# Patient Record
Sex: FEMALE | Race: WHITE | HISPANIC OR LATINO | Employment: PART TIME | URBAN - METROPOLITAN AREA
[De-identification: names, ages, dates, MRNs, and addresses within clinical notes are randomized per-mention and may not be internally consistent; named-entity substitution may affect disease eponyms.]

---

## 2017-01-06 ENCOUNTER — GENERIC CONVERSION - ENCOUNTER (OUTPATIENT)
Dept: OTHER | Facility: OTHER | Age: 47
End: 2017-01-06

## 2017-01-11 ENCOUNTER — GENERIC CONVERSION - ENCOUNTER (OUTPATIENT)
Dept: OTHER | Facility: OTHER | Age: 47
End: 2017-01-11

## 2017-02-02 ENCOUNTER — ALLSCRIPTS OFFICE VISIT (OUTPATIENT)
Dept: OTHER | Facility: OTHER | Age: 47
End: 2017-02-02

## 2017-02-02 ENCOUNTER — TRANSCRIBE ORDERS (OUTPATIENT)
Dept: ADMINISTRATIVE | Facility: HOSPITAL | Age: 47
End: 2017-02-02

## 2017-02-02 DIAGNOSIS — M54.50 LOW BACK PAIN: ICD-10-CM

## 2017-02-02 DIAGNOSIS — R29.90 UNSPECIFIED SYMPTOMS AND SIGNS INVOLVING THE NERVOUS SYSTEM: ICD-10-CM

## 2017-02-02 DIAGNOSIS — M54.5 LOW BACK PAIN, UNSPECIFIED BACK PAIN LATERALITY, UNSPECIFIED CHRONICITY, WITH SCIATICA PRESENCE UNSPECIFIED: Primary | ICD-10-CM

## 2017-02-17 ENCOUNTER — GENERIC CONVERSION - ENCOUNTER (OUTPATIENT)
Dept: OTHER | Facility: OTHER | Age: 47
End: 2017-02-17

## 2017-02-17 ENCOUNTER — HOSPITAL ENCOUNTER (OUTPATIENT)
Dept: MRI IMAGING | Facility: HOSPITAL | Age: 47
Discharge: HOME | End: 2017-02-17
Attending: PHYSICAL MEDICINE & REHABILITATION
Payer: COMMERCIAL

## 2017-02-17 DIAGNOSIS — R29.90 UNSPECIFIED SYMPTOMS AND SIGNS INVOLVING THE NERVOUS SYSTEM: ICD-10-CM

## 2017-02-17 DIAGNOSIS — M54.50 LOW BACK PAIN: ICD-10-CM

## 2017-02-17 PROCEDURE — 72148 MRI LUMBAR SPINE W/O DYE: CPT

## 2017-02-18 ENCOUNTER — GENERIC CONVERSION - ENCOUNTER (OUTPATIENT)
Dept: OTHER | Facility: OTHER | Age: 47
End: 2017-02-18

## 2017-02-23 ENCOUNTER — ALLSCRIPTS OFFICE VISIT (OUTPATIENT)
Dept: OTHER | Facility: OTHER | Age: 47
End: 2017-02-23

## 2017-03-03 ENCOUNTER — HOSPITAL ENCOUNTER (OUTPATIENT)
Dept: MAMMOGRAPHY | Facility: HOSPITAL | Age: 47
Discharge: HOME | End: 2017-03-03
Payer: COMMERCIAL

## 2017-03-03 DIAGNOSIS — Z12.31 ENCOUNTER FOR SCREENING MAMMOGRAM FOR MALIGNANT NEOPLASM OF BREAST: ICD-10-CM

## 2017-03-03 PROCEDURE — G0202 SCR MAMMO BI INCL CAD: HCPCS

## 2017-03-15 ENCOUNTER — GENERIC CONVERSION - ENCOUNTER (OUTPATIENT)
Dept: OTHER | Facility: OTHER | Age: 47
End: 2017-03-15

## 2017-03-16 ENCOUNTER — ALLSCRIPTS OFFICE VISIT (OUTPATIENT)
Dept: OTHER | Facility: OTHER | Age: 47
End: 2017-03-16

## 2017-03-16 DIAGNOSIS — M51.26 OTHER INTERVERTEBRAL DISC DISPLACEMENT, LUMBAR REGION: ICD-10-CM

## 2017-03-16 DIAGNOSIS — M54.16 RADICULOPATHY OF LUMBAR REGION: ICD-10-CM

## 2017-03-24 ENCOUNTER — APPOINTMENT (OUTPATIENT)
Dept: PHYSICAL THERAPY | Facility: MEDICAL CENTER | Age: 47
End: 2017-03-24
Payer: COMMERCIAL

## 2017-03-24 DIAGNOSIS — M54.16 RADICULOPATHY OF LUMBAR REGION: ICD-10-CM

## 2017-03-24 DIAGNOSIS — M51.26 OTHER INTERVERTEBRAL DISC DISPLACEMENT, LUMBAR REGION: ICD-10-CM

## 2017-03-24 PROCEDURE — 97161 PT EVAL LOW COMPLEX 20 MIN: CPT

## 2017-03-29 ENCOUNTER — APPOINTMENT (OUTPATIENT)
Dept: PHYSICAL THERAPY | Facility: MEDICAL CENTER | Age: 47
End: 2017-03-29
Payer: COMMERCIAL

## 2017-03-29 PROCEDURE — 97140 MANUAL THERAPY 1/> REGIONS: CPT

## 2017-03-29 PROCEDURE — 97110 THERAPEUTIC EXERCISES: CPT

## 2017-03-29 PROCEDURE — 97010 HOT OR COLD PACKS THERAPY: CPT

## 2017-03-31 ENCOUNTER — APPOINTMENT (OUTPATIENT)
Dept: PHYSICAL THERAPY | Facility: MEDICAL CENTER | Age: 47
End: 2017-03-31
Payer: COMMERCIAL

## 2017-03-31 PROCEDURE — 97140 MANUAL THERAPY 1/> REGIONS: CPT

## 2017-03-31 PROCEDURE — 97110 THERAPEUTIC EXERCISES: CPT

## 2017-04-03 ENCOUNTER — APPOINTMENT (OUTPATIENT)
Dept: PHYSICAL THERAPY | Facility: MEDICAL CENTER | Age: 47
End: 2017-04-03
Payer: COMMERCIAL

## 2017-04-05 ENCOUNTER — APPOINTMENT (OUTPATIENT)
Dept: PHYSICAL THERAPY | Facility: MEDICAL CENTER | Age: 47
End: 2017-04-05
Payer: COMMERCIAL

## 2017-04-05 PROCEDURE — 97110 THERAPEUTIC EXERCISES: CPT

## 2017-04-05 PROCEDURE — 97010 HOT OR COLD PACKS THERAPY: CPT

## 2017-04-13 ENCOUNTER — APPOINTMENT (OUTPATIENT)
Dept: PHYSICAL THERAPY | Facility: MEDICAL CENTER | Age: 47
End: 2017-04-13
Payer: COMMERCIAL

## 2017-04-13 PROCEDURE — 97110 THERAPEUTIC EXERCISES: CPT

## 2017-04-14 ENCOUNTER — APPOINTMENT (OUTPATIENT)
Dept: PHYSICAL THERAPY | Facility: MEDICAL CENTER | Age: 47
End: 2017-04-14
Payer: COMMERCIAL

## 2017-04-17 ENCOUNTER — APPOINTMENT (OUTPATIENT)
Dept: PHYSICAL THERAPY | Facility: MEDICAL CENTER | Age: 47
End: 2017-04-17
Payer: COMMERCIAL

## 2017-04-21 ENCOUNTER — APPOINTMENT (OUTPATIENT)
Dept: PHYSICAL THERAPY | Facility: MEDICAL CENTER | Age: 47
End: 2017-04-21
Payer: COMMERCIAL

## 2017-04-26 ENCOUNTER — APPOINTMENT (OUTPATIENT)
Dept: PHYSICAL THERAPY | Facility: MEDICAL CENTER | Age: 47
End: 2017-04-26
Payer: COMMERCIAL

## 2017-04-28 ENCOUNTER — APPOINTMENT (OUTPATIENT)
Dept: PHYSICAL THERAPY | Facility: MEDICAL CENTER | Age: 47
End: 2017-04-28
Payer: COMMERCIAL

## 2017-05-05 ENCOUNTER — ALLSCRIPTS OFFICE VISIT (OUTPATIENT)
Dept: OTHER | Facility: OTHER | Age: 47
End: 2017-05-05

## 2017-05-05 LAB
BILIRUB UR QL STRIP: NORMAL
CLARITY UR: NORMAL
COLOR UR: YELLOW
GLUCOSE (HISTORICAL): NORMAL
HGB UR QL STRIP.AUTO: NORMAL
KETONES UR STRIP-MCNC: NORMAL MG/DL
LEUKOCYTE ESTERASE UR QL STRIP: NORMAL
NITRITE UR QL STRIP: NORMAL
PH UR STRIP.AUTO: 8.5 [PH]
PROT UR STRIP-MCNC: NORMAL MG/DL
SP GR UR STRIP.AUTO: 1
UROBILINOGEN UR QL STRIP.AUTO: 0.2

## 2017-05-17 ENCOUNTER — GENERIC CONVERSION - ENCOUNTER (OUTPATIENT)
Dept: OTHER | Facility: OTHER | Age: 47
End: 2017-05-17

## 2017-09-14 ENCOUNTER — ALLSCRIPTS OFFICE VISIT (OUTPATIENT)
Dept: OTHER | Facility: OTHER | Age: 47
End: 2017-09-14

## 2017-10-27 ENCOUNTER — ALLSCRIPTS OFFICE VISIT (OUTPATIENT)
Dept: OTHER | Facility: OTHER | Age: 47
End: 2017-10-27

## 2017-10-27 DIAGNOSIS — R10.11 RIGHT UPPER QUADRANT PAIN: ICD-10-CM

## 2017-10-27 LAB
BILIRUB UR QL STRIP: NORMAL
CLARITY UR: NORMAL
COLOR UR: YELLOW
GLUCOSE (HISTORICAL): NORMAL
HGB UR QL STRIP.AUTO: NORMAL
KETONES UR STRIP-MCNC: NORMAL MG/DL
LEUKOCYTE ESTERASE UR QL STRIP: NORMAL
NITRITE UR QL STRIP: NORMAL
PH UR STRIP.AUTO: 5 [PH]
PROT UR STRIP-MCNC: NORMAL MG/DL
SP GR UR STRIP.AUTO: 1.02
UROBILINOGEN UR QL STRIP.AUTO: 0.2

## 2017-10-28 LAB
A/G RATIO (HISTORICAL): 1.2 (CALC) (ref 1–2.5)
ALBUMIN SERPL BCP-MCNC: 4 G/DL (ref 3.6–5.1)
ALP SERPL-CCNC: 66 U/L (ref 33–115)
ALT SERPL W P-5'-P-CCNC: 12 U/L (ref 6–29)
AST SERPL W P-5'-P-CCNC: 14 U/L (ref 10–35)
BASOPHILS # BLD AUTO: 0.5 %
BASOPHILS # BLD AUTO: 31 CELLS/UL (ref 0–200)
BILIRUB SERPL-MCNC: 0.4 MG/DL (ref 0.2–1.2)
BUN SERPL-MCNC: 14 MG/DL (ref 7–25)
BUN/CREA RATIO (HISTORICAL): NORMAL (CALC) (ref 6–22)
CALCIUM SERPL-MCNC: 9.3 MG/DL (ref 8.6–10.2)
CHLORIDE SERPL-SCNC: 104 MMOL/L (ref 98–110)
CO2 SERPL-SCNC: 26 MMOL/L (ref 20–31)
CREAT SERPL-MCNC: 0.69 MG/DL (ref 0.5–1.1)
DEPRECATED RDW RBC AUTO: 13.8 % (ref 11–15)
EGFR AFRICAN AMERICAN (HISTORICAL): 121 ML/MIN/1.73M2
EGFR-AMERICAN CALC (HISTORICAL): 104 ML/MIN/1.73M2
EOSINOPHIL # BLD AUTO: 167 CELLS/UL (ref 15–500)
EOSINOPHIL # BLD AUTO: 2.7 %
GAMMA GLOBULIN (HISTORICAL): 3.4 G/DL (CALC) (ref 1.9–3.7)
GLUCOSE (HISTORICAL): 92 MG/DL (ref 65–99)
HCT VFR BLD AUTO: 35.9 % (ref 35–45)
HGB BLD-MCNC: 12.1 G/DL (ref 11.7–15.5)
LYMPHOCYTES # BLD AUTO: 1885 CELLS/UL (ref 850–3900)
LYMPHOCYTES # BLD AUTO: 30.4 %
MCH RBC QN AUTO: 29.8 PG (ref 27–33)
MCHC RBC AUTO-ENTMCNC: 33.7 G/DL (ref 32–36)
MCV RBC AUTO: 88.4 FL (ref 80–100)
MONOCYTES # BLD AUTO: 546 CELLS/UL (ref 200–950)
MONOCYTES (HISTORICAL): 8.8 %
NEUTROPHILS # BLD AUTO: 3571 CELLS/UL (ref 1500–7800)
NEUTROPHILS # BLD AUTO: 57.6 %
PLATELET # BLD AUTO: 351 THOUSAND/UL (ref 140–400)
PMV BLD AUTO: 10.9 FL (ref 7.5–12.5)
POTASSIUM SERPL-SCNC: 4.2 MMOL/L (ref 3.5–5.3)
RBC # BLD AUTO: 4.06 MILLION/UL (ref 3.8–5.1)
SODIUM SERPL-SCNC: 138 MMOL/L (ref 135–146)
TOTAL PROTEIN (HISTORICAL): 7.4 G/DL (ref 6.1–8.1)
WBC # BLD AUTO: 6.2 THOUSAND/UL (ref 3.8–10.8)

## 2017-10-28 NOTE — PROGRESS NOTES
Assessment  1  Abdominal pain, RUQ (789 01) (R10 11)    Plan   Abdominal pain, RUQ    · (1) CBC/PLT/DIFF; Status:Active; Requested KHP:[id number];    · (1) COMPREHENSIVE METABOLIC PANEL; Status:Active; Requested KIC:[id number];    · * US ABDOMEN COMPLETE; Status:Hold For - Scheduling; Requested UZE:[id number];       A/P  1 RUQ abdominal pain: we will have you get blood work and an ultrasound of the abdomen done  we will call you with the results  please follow a bland diet in the meantime  2/ S/P UTI: we will get the results of the urine culture from the [facility] but you are alos seeing uro today for this as this is a chronic issue for you  rto prn   Urine Dip Non-Automated- POC; Status:Resulted - Requires Verification,Retrospective By Protocol Authorization;   Done: [id number] 12:00AM  Due:27Oct2018; Last Updated By:Pauline De León; 10/27/2017 12:44:16 PM;Ordered; For:Right lower quadrant abdominal pain; Ordered By:German Aldridge;     Discussion/Summary    danay due - 03/03/2018  pap due - 01/2019  flu - def     Chief Complaint  pt  presents in the office today due to right side pain possibly a UTI, she states that she is having back pain  due - 03/03/2018due - 01/2019- def         History of Present Illness  HPI: Here today with complaint of ongoing RUQ pain  been getting pain in the RUQ on and off for about 3 weeks   was   dull pain that radiates to her back  there in the am when she gets up in the am  is not sure if it is worse with eating   Bartholome Pinks  just gets this on and off  af but not immediately after eating rated up to a 7 on a 1-10 scale  on the 15th of October started with with burning and urgency with urination  on the 17th [facility]   they did a urine and was told that she had a uti and was started on Bactrim  pain in the RUQ went way while on the antibiotic  the last Bactrim 3 days ago and since then the pain in the RUQ started back again  Bartholome Pinks gets nauseous with the pain issues resolved        Review of Systems    Constitutional: No fever, no chills, feels well, no tiredness, no recent weight gain or loss  Cardiovascular: no complaints of slow or fast heart rate, no chest pain, no palpitations, no leg claudication or lower extremity edema  Respiratory: no complaints of shortness of breath, no wheezing, no dyspnea on exertion, no orthopnea or PND  Gastrointestinal: as noted in HPI  Genitourinary: no complaints of dysuria, no incontinence, no pelvic pain, no dysmenorrhea, no vaginal discharge or abnormal vaginal bleeding-- and-- as noted in HPI  Musculoskeletal: no complaints of arthralgia, no myalgia, no joint swelling or stiffness, no limb pain or swelling  Integumentary: no complaints of skin rash or lesion, no itching or dry skin, no skin wounds  Active Problems  1  Abnormal neurological exam (781 99) (R29 90)   2  Allergic rhinitis (477 9) (J30 9)   3  Colonoscopy (Fiberoptic) Screening   4  Constipation (564 00) (K59 00)   5  Esophageal reflux (530 81) (K21 9)   6  Herniated lumbar intervertebral disc (722 10) (M51 26)   7  Hyperlipidemia (272 4) (E78 5)   8  Internal hemorrhoids (455 0) (K64 8)   9  Never a smoker   10  Vitamin D deficiency (268 9) (E55 9)    Past Medical History  1  History of Abdominal pain, RUQ (789 01) (R10 11)   2  History of Encounter for routine gynecological examination (V72 31) (Z01 419)   3  History of Encounter for routine gynecological examination (V72 31) (Z01 419)   4  History of abdominal pain (V13 89) (Z87 898)   5  History of asthma (V12 69) (Z87 09)   6  History of constipation (V12 79) (Z87 19)   7  History of esophageal reflux (V12 79) (Z87 19)   8  History of neoplasm of uncertain behavior of skin (V13 3) (Z86 03)   9  History of screening mammography (V15 89) (Z92 89)   10  History of Symptoms involving urinary system (788 99) (R39 9)   11  History of Well adult on routine health check (V70 0) (Z00 00)    Family History  Mother    1   Denied: Family history of drug abuse   2  Family history of Heart Disease (V17 49)   3  Denied: Family history of Mental health problem   4  Family history of Mother  At Age 62  Father    11  Denied: Family history of drug abuse   6  Family history of Heart Disease (V17 49)   7  Denied: Family history of Mental health problem  Maternal Grandfather    8  Family history of Prostate Cancer (Z78 63)  Paternal Grandfather    5  Family history of Gastric Cancer (V16 0)  Maternal Uncle    10  Family history of colon cancer (V16 0) (Z80 0)   11  Family history of malignant neoplasm of stomach (V16 0) (Z80 0)  Family History Reviewed: The family history was reviewed and updated today  Social History   · Always uses seat belt   · Caffeine Use   · 2 cups coffee/day   · Employed   · Lives with family   ·    · Never a smoker   · No alcohol use   · No illicit drug use   · Student  The social history was reviewed and updated today  The social history was reviewed and is unchanged  Surgical History  1  History of Complete Colonoscopy   2  History of Dental Surgery   3  History of Dilation And Curettage   4  History of Tonsillectomy   5  History of Tubal Ligation    Current Meds   1  Ibuprofen 200 MG Oral Tablet; Therapy: (Recorded:2017) to Recorded   2  Metamucil POWD;   Therapy: (Recorded:2017) to Recorded   3  Milk of Magnesia 400 MG/5ML Oral Suspension; 15 ml by mouth x 1 dose, may repeat in   4 hours if no BM; Therapy: 13GGK4620 to (Last Rx:2016) Ordered   4  Tylenol 500 MG CAPS; Therapy: (Recorded:2017) to Recorded   5  Ventolin  (90 Base) MCG/ACT Inhalation Aerosol Solution; INHALE 2 PUFFS   EVERY 4-6 HOURS AS NEEDED  Requested for: 14UEY1615; Last YW:[id number]   Ordered   6  Zantac 150 MG Oral Tablet; TAKE 1 TABLET DAILY AS NEEDED; Therapy: (Recorded:2016) to Recorded    Allergies  1  No Known Drug Allergies  2   Other    Vitals   Recorded: 97APP0294 11:33AM   Temperature 98 1 F   Heart Rate 78   Respiration 14   Systolic 453   Diastolic 78   Height 5 ft    Weight 143 lb    BMI Calculated 27 93   BSA Calculated 1 62     Physical Exam    Constitutional   General appearance: No acute distress, well appearing and well nourished  Pulmonary   Auscultation of lungs: Clear to auscultation  Cardiovascular   Auscultation of heart: Normal rate and rhythm, normal S1 and S2, without murmurs  Carotid pulses: Normal     Abdomen   Abdomen: Abnormal  -- R UQ pain to palpation with positive Quenemo sign  Liver and spleen: No hepatomegaly or splenomegaly  Lymphatic   Palpation of lymph nodes in neck: No lymphadenopathy  Skin   Skin and subcutaneous tissue: Normal without rashes or lesions      Psychiatric   Orientation to person, place, and time: Normal     Mood and affect: Normal          Signatures   Electronically signed by : Mali Rucker; Oct 27 2017  1:25PM EST                       (Author)    Electronically signed by : James Kuhn DO; Oct 27 2017  5:25PM EST

## 2017-10-29 LAB — CULTURE RESULT (HISTORICAL): NORMAL

## 2017-11-01 ENCOUNTER — HOSPITAL ENCOUNTER (OUTPATIENT)
Dept: ULTRASOUND IMAGING | Facility: HOSPITAL | Age: 47
Discharge: HOME | End: 2017-11-01
Payer: COMMERCIAL

## 2017-11-01 DIAGNOSIS — R10.11 RIGHT UPPER QUADRANT PAIN: ICD-10-CM

## 2017-11-01 PROCEDURE — 76700 US EXAM ABDOM COMPLETE: CPT

## 2018-01-12 VITALS
WEIGHT: 145 LBS | DIASTOLIC BLOOD PRESSURE: 60 MMHG | HEIGHT: 60 IN | BODY MASS INDEX: 28.47 KG/M2 | SYSTOLIC BLOOD PRESSURE: 114 MMHG | RESPIRATION RATE: 12 BRPM | HEART RATE: 80 BPM

## 2018-01-12 VITALS
RESPIRATION RATE: 14 BRPM | TEMPERATURE: 98.1 F | SYSTOLIC BLOOD PRESSURE: 118 MMHG | HEART RATE: 78 BPM | DIASTOLIC BLOOD PRESSURE: 78 MMHG | HEIGHT: 60 IN | WEIGHT: 143 LBS | BODY MASS INDEX: 28.07 KG/M2

## 2018-01-13 VITALS
HEART RATE: 68 BPM | HEIGHT: 60 IN | DIASTOLIC BLOOD PRESSURE: 78 MMHG | BODY MASS INDEX: 28.66 KG/M2 | WEIGHT: 146 LBS | SYSTOLIC BLOOD PRESSURE: 126 MMHG

## 2018-01-13 NOTE — RESULT NOTES
Verified Results  * MRI LUMBAR SPINE WO CONTRAST [id number] 07:15AM Halima Segura Order Number: [id number]   Performing Comments: Pt failed PT a with last Tx August 2016, today has assymmetric refklex exam that correlates to possible right S-1 root compression syndrome    - Patient Instructions: Being done at [facility] on 2/17, be there at 7:15AM    Bring photo ID, list of medications & insurance cards   Bring this form, NO jewelry     Test Name Result Flag Reference   MRI [address] SUBRAMANIAN (Report)     This is a summary report  The complete report is available in the patient's medical record  If you cannot access the medical record, please contact the sending organization for a detailed fax or copy  MRI LUMBAR SPINE WITHOUT CONTRAST     INDICATION: Low back pain with right-sided sciatica  Possible S1 nerve root compression on the right  COMPARISON: None  TECHNIQUE: Sagittal T1, sagittal T2, sagittal inversion recovery, axial T1 and axial T2, coronal T2       IMAGE QUALITY: Diagnostic     FINDINGS:     ALIGNMENT: Normal alignment of the lumbar spine  No compression fracture  No spondylolysis or spondylolisthesis  No scoliosis  MARROW SIGNAL: Slightly heterogeneous marrow signal on T1-weighted imaging with scattered hemangiomas  No focal marrow edema  DISTAL CORD AND CONUS: Normal size and signal within the distal cord and conus  The conus ends at the L1 level  PARASPINAL SOFT TISSUES: Paraspinal soft tissues are unremarkable  SACRUM: Normal signal within the sacrum  No evidence of insufficiency or stress fracture  LOWER THORACIC DISC SPACES: Normal disc height and signal  No disc herniation, canal stenosis or foraminal narrowing  LUMBAR DISC SPACES:        L1-L2: Normal      L2-L3: Normal      L3-L4: Slight annular bulging with a small broad-based left foraminal and lateral disc protrusion  No canal stenosis   Moderate left foraminal narrowing with slight displacement of the nerve  Correlate for left L3 radiculopathy  L4-L5: Mild annular bulging with a moderate broad-based central and right paracentral disc herniation which is slightly inferiorly extruded to the right of midline  This distorts the right anterolateral aspect of the thecal sac with mass effect upon    the right L5 nerve  Mild canal stenosis  No foraminal nerve impingement  L5-S1: Loss of disc height  Mild annular bulging  Partial sacralization of the L5 vertebral body bilaterally  No disc herniation, canal stenosis or foraminal narrowing  IMPRESSION:     Please note the L5 vertebral body is partially sacralized bilaterally  At L4-5 there is a broad-based central and right paracentral disc herniation which is slightly inferiorly extruded on the right and distorts the right anterolateral aspect of the thecal sac with mild mass effect upon the right L5 nerve  Correlate for    right L5 radiculopathy  Small broad-based left foraminal disc protrusion at L3-4 with moderate left foraminal narrowing  Correlate for left L3 radiculopathy         Workstation performed: [id number]     Signed by:   Pavan Christianson DO   2/17/17

## 2018-01-14 VITALS
RESPIRATION RATE: 14 BRPM | TEMPERATURE: 98 F | BODY MASS INDEX: 28.11 KG/M2 | SYSTOLIC BLOOD PRESSURE: 100 MMHG | HEART RATE: 76 BPM | DIASTOLIC BLOOD PRESSURE: 62 MMHG | WEIGHT: 143.19 LBS | HEIGHT: 60 IN

## 2018-01-14 VITALS
HEART RATE: 80 BPM | TEMPERATURE: 97.1 F | WEIGHT: 138.5 LBS | HEIGHT: 60 IN | RESPIRATION RATE: 14 BRPM | BODY MASS INDEX: 27.19 KG/M2 | DIASTOLIC BLOOD PRESSURE: 76 MMHG | SYSTOLIC BLOOD PRESSURE: 117 MMHG

## 2018-01-14 NOTE — PROGRESS NOTES
Assessment    1  Encounter for preventive health examination (V70 0) (Z00 00)    Plan  Encounter for screening mammogram for malignant neoplasm of breast    · DIGTL SCRN MAMMO W/CAD ; every 1 year; Last 48Kya3367; Next 47JRW0075;  [address] Maintenance    · Follow-up visit in 1 year Evaluation and Treatment  Follow-up  Status: Complete  Done:  42QPH4544    Discussion/Summary  health maintenance visit Currently, she eats an adequate diet and has an adequate exercise regimen  cervical cancer screening is current cervical cancer screening is managed by Gyn Dr Palma Mortensen Breast cancer screening: monthly self breast exam was advised and mammogram is current  Colorectal cancer screening: colorectal cancer screening is current and the next colonoscopy is due 02/2017  The immunizations are up to date  Advice and education were given regarding nutrition, weight bearing exercise, calcium supplements, vitamin D supplements, sunscreen use and self skin examination  Patient discussion: discussed with the patient  Chief Complaint  pt here for yearly pe    colonoscopy due 02/20/2017  mammo due 12/28/2016  pap due 10/30/2016      History of Present Illness  HM, Adult Female: The patient is being seen for a health maintenance evaluation  General Health: The patient's health since the last visit is described as good  She has regular dental visits  She denies vision problems  She denies hearing loss  Immunizations status: up to date  Lifestyle:  She consumes a diverse and healthy diet  She exercises regularly  She does not use tobacco  She denies alcohol use  She denies drug use  Reproductive health: the patient is premenopausal   she reports normal menses  she uses contraception  For contraception, she has had a tubal occlusion  Screening:      Review of Systems    Constitutional: No fever, no chills, feels well, no tiredness, no recent weight gain or weight loss     Eyes: No complaints of eye pain, no red eyes, no eyesight problems, no discharge, no dry eyes, no itching of eyes  ENT: no complaints of earache, no loss of hearing, no nose bleeds, no nasal discharge, no sore throat, no hoarseness  Cardiovascular: No complaints of slow heart rate, no fast heart rate, no chest pain, no palpitations, no leg claudication, no lower extremity edema  Respiratory: No complaints of shortness of breath, no wheezing, no cough, no SOB on exertion, no orthopnea, no PND  Gastrointestinal: No complaints of abdominal pain, no constipation, no nausea or vomiting, no diarrhea, no bloody stools  Genitourinary: no dysuria, no pelvic pain, no vaginal discharge, no dysmenorrhea and no unexplained vaginal bleeding  Musculoskeletal: no arthralgias, no joint swelling, no limb pain, no joint stiffness and no limb swelling  Integumentary: no rashes and no skin lesions  Neurological: no headache, no numbness, no tingling, no dizziness, no limb weakness, no fainting and no difficulty walking  Psychiatric: no anxiety, no sleep disturbances and no depression  Hematologic/Lymphatic: no swollen glands  Surgical History    · History of Complete Colonoscopy   · History of Dental Surgery   · History of Dilation And Curettage   · History of Tonsillectomy   · History of Tubal Ligation    Family History  Mother    · Family history of Heart Disease (V17 49)   · Family history of Mother  At Age 62  Father    · Family history of Heart Disease (V17 49)  Maternal Grandfather    · Family history of Prostate Cancer (V16 42)  Paternal Grandfather    · Family history of Gastric Cancer (V16 0)  Maternal Uncle    · Family history of colon cancer (V16 0) (Z80 0)   · Family history of malignant neoplasm of stomach (V16 0) (Z80 0)    Social History    · Denied: History of Alcohol Use (History)   · Always uses seat belt   · Caffeine Use   · 2 cups coffee/day   · Denied: History of Drug Use   · Never a smoker    Current Meds   1   Fiber CAPS; USE AS DIRECTED; Therapy: (Nunu Simpson) to Recorded   2  Ventolin  (90 Base) MCG/ACT Inhalation Aerosol Solution; INHALE 2 PUFFS   EVERY 4-6 HOURS AS NEEDED  Requested for: 84KVT5804; Last IJ:76TXM0936   Ordered   3  Zantac 150 MG Oral Tablet; TAKE 1 TABLET DAILY AS NEEDED; Therapy: (Recorded:20Jun2016) to Recorded    Allergies    1  No Known Drug Allergies    2  Other    Vitals   Recorded: 20Jun2016 08:08AM   Heart Rate 72   Respiration 16   Systolic 195, LUE, Sitting   Diastolic 64, LUE, Sitting   Height 4 ft 11 7 in   Weight 141 lb    BMI Calculated 27 81   BSA Calculated 1 6     Physical Exam    Constitutional   General appearance: No acute distress, well appearing and well nourished  Eyes   Conjunctiva and lids: No swelling, erythema or discharge  PERRL, EOMI  Ears, Nose, Mouth, and Throat   External inspection of ears and nose: Normal     Otoscopic examination: Tympanic membranes translucent with normal light reflex  Canals patent without erythema  Lips, teeth, and gums: Normal, good dentition  Oropharynx: Normal with no erythema, edema, exudate or lesions  Neck   Neck: Supple, symmetric, trachea midline, no masses  Thyroid: Normal, no thyromegaly  Pulmonary   Respiratory effort: No increased work of breathing or signs of respiratory distress  Auscultation of lungs: Clear to auscultation  Cardiovascular   Auscultation of heart: Normal rate and rhythm, normal S1 and S2, no murmurs  Examination of extremities for edema and/or varicosities: Normal     Abdomen   Abdomen: Non-tender, no masses  Liver and spleen: No hepatomegaly or splenomegaly  Lymphatic   Palpation of lymph nodes in neck: No lymphadenopathy  Musculoskeletal   Muscle strength/tone: Normal     Neurologic   Cranial nerves: Cranial nerves II-XII intact  Reflexes: 2+ and symmetric  Psychiatric   Orientation to person, place, and time: Normal     Recent and remote memory: Intact      Mood and affect: Normal        Results/Data  (1) LIPID PANEL, FASTING [id number] 10:02AM Aries Jose     Test Name Result Flag Reference   CHOLESTEROL, TOTAL 203 mg/dL H 125-200   HDL CHOLESTEROL 45 mg/dL L > OR = 46   TRIGLICERIDES 371 mg/dL  <150   LDL-CHOLESTEROL 132 mg/dL (calc) H <130   Desirable range <100 mg/dL for patients with CHD or  diabetes and <70 mg/dL for diabetic patients with  known heart disease  CHOL/HDLC RATIO 4 5 (calc)  < OR = 5 0   NON HDL CHOLESTEROL 158 mg/dL (calc)     Target for non-HDL cholesterol is 30 mg/dL higher than   LDL cholesterol target  (1) CBC/PLT/DIFF [id number] 10:02AM Aries Jsoe     Test Name Result Flag Reference   WHITE BLOOD CELL COUNT 6 4 Thousand/uL  3 8-10 8   RED BLOOD CELL COUNT 4 15 Million/uL  3 80-5 10   HEMOGLOBIN 12 0 g/dL  11 7-15 5   HEMATOCRIT 37 2 %  35 0-45 0   MCV 89 6 fL  80 0-100 0   MCH 28 9 pg  27 0-33 0   MCHC 32 2 g/dL  32 0-36 0   RDW 15 1 % H 11 0-15 0   PLATELET COUNT 839 Thousand/uL  140-400   MPV 9 3 fL  7 5-11 5   ABSOLUTE NEUTROPHILS 3379 cells/uL  6635-9248   ABSOLUTE LYMPHOCYTES 2394 cells/uL  850-3900   ABSOLUTE MONOCYTES 435 cells/uL  200-950   ABSOLUTE EOSINOPHILS 147 cells/uL     ABSOLUTE BASOPHILS 45 cells/uL  0-200   NEUTROPHILS 52 8 %     LYMPHOCYTES 37 4 %     MONOCYTES 6 8 %     EOSINOPHILS 2 3 %     BASOPHILS 0 7 %       (1) COMPREHENSIVE METABOLIC PANEL [id number] 96:76IO Aries Garcia     Test Name Result Flag Reference   GLUCOSE 92 mg/dL  65-99   Fasting reference interval   UREA NITROGEN (BUN) 15 mg/dL  7-25   CREATININE 0 75 mg/dL  0 50-1 10   eGFR NON-AFR   AMERICAN 96 mL/min/1 73m2  > OR = 60   eGFR AFRICAN AMERICAN 112 mL/min/1 73m2  > OR = 60   BUN/CREATININE RATIO   6-15   NOT APPLICABLE (calc)   SODIUM 137 mmol/L  135-146   POTASSIUM 4 2 mmol/L  3 5-5 3   CHLORIDE 105 mmol/L     CARBON DIOXIDE 24 mmol/L  19-30   CALCIUM 9 4 mg/dL  8 6-10 2   PROTEIN, TOTAL 7 1 g/dL  6 1-8 1   ALBUMIN 3 9 g/dL 3  6-5 1   GLOBULIN 3 2 g/dL (calc)  1 9-3 7   ALBUMIN/GLOBULIN RATIO 1 2 (calc)  1 0-2 5   BILIRUBIN, TOTAL 0 4 mg/dL  0 2-1 2   ALKALINE PHOSPHATASE 59 U/L     AST 13 U/L  10-35   ALT 12 U/L  6-29     (Q) TSH, 3RD GENERATION W/REFLEX TO FT4 [id number] 10:02AM Nelli Vásquez   REPORT COMMENT:  FASTING:YES     Test Name Result Flag Reference   TSH W/REFLEX TO FT4 1 69 mIU/L     Reference Range                         > or = 20 Years  0 40-4 50                              Pregnancy Ranges            First trimester    0 26-2 66            Second trimester   0 55-2 73            Third trimester    0 43-2 91       Health Management  Colonoscopy (Fiberoptic) Screening   COLONOSCOPY; every 3 years; Last 78Gox7900; Next Due: 86Mnk6297; Active  Encounter for routine gynecological examination   (every) THINPREP PAP; every 2 years; Last 65CLV5750; Next Due: 63GAC5077; Active  Encounter for screening mammogram for malignant neoplasm of breast   DIGTL SCRN MAMMO W/CAD; every 1 year; Last 48Odm1272; Next Due: 13FCK0919; Active    Future Appointments    Date/Time Provider Specialty Site   06/23/2017 10:00 AM VISHAL Harp   Family [address]     Signatures   Electronically signed by : VISHAL Alvarado ; Jun 20 2016  9:10AM EST                       (Author)

## 2018-01-17 NOTE — PROGRESS NOTES
Assessment    1  Herniated lumbar intervertebral disc (722 10) (M51 26)   2  Lumbar radiculitis (724 4) (M54 16)    Plan  Herniated lumbar intervertebral disc    · 1 - Carlitosond Angel Mendoza DO (Anesthesia) Physician Referral  Evaluation and Treatment: the  expectation is that the referred to provider will communicate back to the patient on  treatment options  Status: Active  Requested for: 49Sqi6956  Care Summary provided  : Yes   · Follow-up visit in 6 weeks Evaluation and Treatment  Follow-up  Status: Hold For -  Scheduling  Requested for: 19Oez5246  Herniated lumbar intervertebral disc, Lumbar radiculitis, Sciatica of right side    · MethylPREDNISolone 4 MG Oral Tablet Therapy Pack; Take according to directions    Discussion/Summary  Impression: disc herniation and right L-5 radiculopathy  Currently, the condition is not responding to treatment  Medication changes are as documented in orders  Treatment plan includes pain medicine consultation and evaluation for epidural steroid injection  Chief Complaint  Sub acute LBP consult from PCP   2/23 follow up after MRI      History of Present Illness  54 yo female with non-traumatic onset of LBP about mid December afetr riding trip to [place]  Noticed next day   pattern is diffuse LBP, right buttock, and lateral leg to ankle not foot  Usually better with siting, worse with standing  Had tried muscle relaxant  Throbbing  No bowel or bladder incontinence, but increased pain with BM  Had prior episode in July 2016 and resolved with PTx  This is worse episode   missed some work  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)  Had PT at [facility] INTO August 2016  True Greene  (last sih=gn off by Dr Mary Lopes 7/29)   2/23 f/u after MRI some benefit from NSAIDS N/T into right lateral calf and ankle  Review of Systems    Musculoskeletal: as noted in HPI  Neurological: numbness and tingling  Active Problems    1   Abnormal neurological exam (781 99) (R29 90)   2  Colonoscopy (Fiberoptic) Screening   3  Constipation (564 00) (K59 00)   4  Encounter for routine gynecological examination (V72 31) (Z01 419)   5  Encounter for screening colonoscopy (V76 51) (Z12 11)   6  Encounter for screening mammogram for malignant neoplasm of breast (V76 12)   (Z12 31)   7  Esophageal reflux (530 81) (K21 9)   8  Hyperlipidemia (272 4) (E78 5)   9  Internal hemorrhoids (455 0) (K64 8)   10  Low back pain (724 2) (M54 5)   11  Need for prophylactic vaccination and inoculation against influenza (V04 81) (Z23)   12  Never a smoker   13  Sciatica of right side (724 3) (M54 31)   14  Screening examination for pulmonary tuberculosis (V74 1) (Z11 1)   15  Strain of thoracic region (847 1) (S29 019A)   16  Vitamin D deficiency (268 9) (E55 9)    Past Medical History    1  History of Abdominal pain, RUQ (789 01) (R10 11)   2  History of Encounter for routine gynecological examination (V72 31) (Z01 419)   3  History of abdominal pain (V13 89) (Z87 898)   4  History of asthma (V12 69) (Z87 09)   5  History of constipation (V12 79) (Z87 19)   6  History of esophageal reflux (V12 79) (Z87 19)   7  History of neoplasm of uncertain behavior of skin (V13 3) (Z86 03)   8  Need for prophylactic vaccination and inoculation against influenza (V04 81) (Z23)   9  History of Well adult on routine health check (V70 0) (Z00 00)    The active problems and past medical history were reviewed and updated today  Surgical History    1  History of Complete Colonoscopy   2  History of Dental Surgery   3  History of Dilation And Curettage   4  History of Tonsillectomy   5  History of Tubal Ligation    The surgical history was reviewed and updated today  Family History  Mother    1  Family history of Heart Disease (V17 49)   2  Family history of Mother  At Age 62  Father    3  Family history of Heart Disease (V17 49)  Maternal Grandfather    4   Family history of Prostate Cancer (V16 42)  Paternal Grandfather    5  Family history of Gastric Cancer (V16 0)  Maternal Uncle    6  Family history of colon cancer (V16 0) (Z80 0)   7  Family history of malignant neoplasm of stomach (V16 0) (Z80 0)    The family history was reviewed and updated today  Social History    · Denied: History of Alcohol Use (History)   · Always uses seat belt   · Caffeine Use   · Denied: History of Drug Use   · Never a smoker  The social history was reviewed and is unchanged  Current Meds   1  Fiber CAPS; USE AS DIRECTED; Therapy: (Priscilla Rides) to Recorded   2  Ibuprofen 200 MG Oral Tablet; Therapy: (Recorded:02Feb2017) to Recorded   3  Meloxicam 7 5 MG Oral Tablet; Take one after morning meal daily, may take one after   evening meal as needed; Therapy: 25LQP1008 to (Last Rx:02Feb2017)  Requested for: 02Feb2017 Ordered   4  Milk of Magnesia 400 MG/5ML Oral Suspension; 15 ml by mouth x 1 dose, may repeat in   4 hours if no BM; Therapy: 21Oct2016 to (Last Rx:21Oct2016) Ordered   5  Omeprazole 20 MG Oral Capsule Delayed Release; Take one capsule once daily before   eating  Requested for: 45SFG1675; Last Rx:69Ija9279 Ordered   6  Orphenadrine Citrate  MG Oral Tablet Extended Release 12 Hour; TAKE 1   TABLET TWICE DAILY AS NEEDED; Therapy: 26Oci4568 to (Evaluate:18Jan2017)  Requested for: 36OXY1824; Last   Rx:21Otu4078 Ordered   7  Polyethylene Glycol 3350 Oral Powder (MiraLax); MIX 17 GRAMS IN 8 OUNCES OF   WATER AND DRINK ONCE DAILY as neded for constipation; Therapy: 07IQT7665 to (Last [facility])  Requested for: 21Oct2016 Ordered   8  Tylenol 500 MG CAPS; Therapy: (Recorded:02Feb2017) to Recorded   9  Ventolin  (90 Base) MCG/ACT Inhalation Aerosol Solution; INHALE 2 PUFFS   EVERY 4-6 HOURS AS NEEDED  Requested for: 75QXZ1989; Last PX:29BBL1306   Ordered   10  Zantac 150 MG Oral Tablet (RaNITidine HCl); TAKE 1 TABLET DAILY AS NEEDED;     Therapy: (Recorded:20Jun2016) to Recorded    The medication list was reviewed and updated today  Allergies    1  No Known Drug Allergies    2  Other    Vitals  Signs   Recorded: 85Hqo8563 01:07PM   Heart Rate: 88  Systolic: 972  Diastolic: 80  Height: 5 ft   Weight: 145 lb   BMI Calculated: 28 32  BSA Calculated: 1 62    Physical Exam    Constitutional   General appearance: Abnormal   uncomfortable  Lumbar/Sacral Spine examination demonstrates Lumbosacral Spine:   Evaluation of Muscle Stretch Reflexes on the right side demonstrates 1/4 Hamstring Reflex, 1/4 Knee Jerk Reflex and 1/4 Ankle Jerk Reflex  Evaluation of Muscle Stretch Reflexes on the left side demonstrates 1/4 Hamstring Reflex, 1/4 Knee Jerk Reflex and 1/4 Ankle Jerk Reflex  Special Tests: positive Straight Leg Raise on right  Results/Data  I personally reviewed the films/images/results in the office today  My interpretation follows  MRI Review L-4/5 disc extrusion to right L-5  Future Appointments    Date/Time Provider Specialty Site   06/23/2017 10:00 AM VISHAL Grande [facility] FP     Signatures   Electronically signed by : Whitney Torres DO; Feb 23 2017  1:28PM EST                       (Author)

## 2018-01-17 NOTE — PROGRESS NOTES
Assessment    1  Herniated lumbar intervertebral disc (722 10) (M51 26)    Plan  Herniated lumbar intervertebral disc    · Follow-up PRN Evaluation and Treatment  Follow-up  Status: Complete  Done:  26BPC3296 04:17PM    Discussion/Summary  Treatment plan includes exercise regimen  Patient discussion: discussed with the patient, call as neede  Chief Complaint  Sub acute LBP consult from PCP   2/23 follow up after MRI  5/5 follow up for same  History of Present Illness  54 yo female with non-traumatic onset of LBP about mid December after riding trip to [place]  Noticed next day   pattern is diffuse LBP, right buttock, and lateral leg to ankle not foot  Usually better with siting, worse with standing  Had tried muscle relaxant  Throbbing  No bowel or bladder incontinence, but increased pain with BM  Had prior episode in July 2016 and resolved with PTx  This is worse episode   missed some work  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)  Had PT at [facility] AT THE [facility] INTO August 2016  Heike Chan  (last sign off by Dr Amy Guajardo 7/29)   2/23 f/u after MRI some benefit from NSAIDS N/T into right lateral calf and ankle  5/5 follow up while in PTx  seen by Pain Medicine was doing better so interventions held   sent ot Ptx  markdly improved no radicular Sx  Review of Systems    Musculoskeletal: as noted in HPI  Active Problems    1  Abnormal neurological exam (781 99) (R29 90)   2  Allergic rhinitis (477 9) (J30 9)   3  Colonoscopy (Fiberoptic) Screening   4  Constipation (564 00) (K59 00)   5  Encounter for routine gynecological examination (V72 31) (Z01 419)   6  Encounter for screening colonoscopy (V76 51) (Z12 11)   7  Encounter for screening mammogram for malignant neoplasm of breast (V76 12)   (Z12 31)   8  Esophageal reflux (530 81) (K21 9)   9  Herniated lumbar intervertebral disc (722 10) (M51 26)   10  Hyperlipidemia (272 4) (E78 5)   11   Internal hemorrhoids (455 0) (K64 8)   12  Low back pain (724 2) (M54 5)   13  Lumbar radiculitis (724 4) (M54 16)   14  Need for prophylactic vaccination and inoculation against influenza (V04 81) (Z23)   15  Never a smoker   16  Sciatica of right side (724 3) (M54 31)   17  Screening examination for pulmonary tuberculosis (V74 1) (Z11 1)   18  Strain of thoracic region (847 1) (S29 019A)   19  Symptoms involving urinary system (788 99) (R39 9)   20  Vitamin D deficiency (268 9) (E55 9)    Past Medical History    1  History of Abdominal pain, RUQ (789 01) (R10 11)   2  History of Encounter for routine gynecological examination (V72 31) (Z01 419)   3  History of abdominal pain (V13 89) (Z87 898)   4  History of asthma (V12 69) (Z87 09)   5  History of constipation (V12 79) (Z87 19)   6  History of esophageal reflux (V12 79) (Z87 19)   7  History of neoplasm of uncertain behavior of skin (V13 3) (Z86 03)   8  Need for prophylactic vaccination and inoculation against influenza (V04 81) (Z23)   9  History of Symptoms involving urinary system (788 99) (R39 9)   10  History of Well adult on routine health check (V70 0) (Z00 00)    The active problems and past medical history were reviewed and updated today  Surgical History    1  History of Complete Colonoscopy   2  History of Dental Surgery   3  History of Dilation And Curettage   4  History of Tonsillectomy   5  History of Tubal Ligation    The surgical history was reviewed and updated today  Family History  Mother    1  Family history of Heart Disease (V17 49)   2  Family history of Mother  At Age 62  Father    3  Family history of Heart Disease (V17 49)  Maternal Grandfather    4  Family history of Prostate Cancer (V16 42)  Paternal Grandfather    5  Family history of Gastric Cancer (V16 0)  Maternal Uncle    6  Family history of colon cancer (V16 0) (Z80 0)   7   Family history of malignant neoplasm of stomach (V16 0) (Z80 0)    Social History    · Always uses seat belt   · Caffeine Use   · Employed   · Lives with family   ·    · Never a smoker   · No alcohol use   · No illicit drug use   · Student    Current Meds   1  Ibuprofen 200 MG Oral Tablet; Therapy: (Recorded:05May2017) to Recorded   2  Metamucil POWD;   Therapy: (Recorded:05May2017) to Recorded   3  Milk of Magnesia 400 MG/5ML Oral Suspension; 15 ml by mouth x 1 dose, may repeat in   4 hours if no BM; Therapy: 27DOD8149 to (Last Rx:21Oct2016) Ordered   4  Tylenol 500 MG CAPS; Therapy: (Recorded:05May2017) to Recorded   5  Ventolin  (90 Base) MCG/ACT Inhalation Aerosol Solution; INHALE 2 PUFFS   EVERY 4-6 HOURS AS NEEDED  Requested for: 22FGQ1851; Last YT:26VWY1116   Ordered   6  Zantac 150 MG Oral Tablet (RaNITidine HCl); TAKE 1 TABLET DAILY AS NEEDED; Therapy: (Christina Reining) to Recorded   7  ZyrTEC Allergy 10 MG Oral Capsule; Therapy: (Recorded:05May2017) to Recorded    The medication list was reviewed and updated today  Allergies    1  No Known Drug Allergies    2  Other    Vitals  Signs   Recorded: 89NIB9720 04:00PM   Heart Rate: 92  Systolic: 322  Diastolic: 80  Height: 5 ft   Weight: 144 lb 3 04 oz  BMI Calculated: 28 16  BSA Calculated: 1 62    Physical Exam    Constitutional   General appearance: Well developed, well nourished, alert, in no distress, non-toxic and no overt pain behavior  Lumbar/Sacral Spine examination demonstrates Lumbosacral Spine:   Evaluation of Muscle Stretch Reflexes on the right side demonstrates 1/4 Hamstring Reflex, 1/4 Knee Jerk Reflex and 1/4 Ankle Jerk Reflex  Evaluation of Muscle Stretch Reflexes on the left side demonstrates 1/4 Hamstring Reflex, 1/4 Knee Jerk Reflex and 1/4 Ankle Jerk Reflex  Special Tests: negative Straight Leg Raise on right and negative Straight Leg Raise on left  Results/Data  I personally reviewed the films/images/results in the office today  My interpretation follows  MRI Review obvious HNP at L-4/5 to the right  Diagnostic Review no PTx notes (?)  Future Appointments    Date/Time Provider Specialty Site   06/23/2017 10:00 AM VISHAL Rosas 50 FP     Signatures   Electronically signed by : Sindy Vergara DO; May  5 2017  4:18PM EST                       (Author)

## 2018-01-22 VITALS
HEART RATE: 92 BPM | DIASTOLIC BLOOD PRESSURE: 80 MMHG | SYSTOLIC BLOOD PRESSURE: 120 MMHG | WEIGHT: 144.19 LBS | BODY MASS INDEX: 28.31 KG/M2 | HEIGHT: 60 IN

## 2018-01-22 VITALS
SYSTOLIC BLOOD PRESSURE: 114 MMHG | HEART RATE: 88 BPM | WEIGHT: 145 LBS | HEIGHT: 60 IN | DIASTOLIC BLOOD PRESSURE: 80 MMHG | BODY MASS INDEX: 28.47 KG/M2

## 2019-01-16 ENCOUNTER — TRANSCRIBE ORDERS (OUTPATIENT)
Dept: ADMINISTRATIVE | Facility: HOSPITAL | Age: 49
End: 2019-01-16

## 2019-01-16 DIAGNOSIS — Z12.39 SCREENING BREAST EXAMINATION: Primary | ICD-10-CM

## 2019-02-22 ENCOUNTER — HOSPITAL ENCOUNTER (OUTPATIENT)
Dept: MAMMOGRAPHY | Facility: MEDICAL CENTER | Age: 49
Discharge: HOME | End: 2019-02-22
Payer: COMMERCIAL

## 2019-02-22 VITALS — HEIGHT: 60 IN | BODY MASS INDEX: 28.07 KG/M2 | WEIGHT: 143 LBS

## 2019-02-22 DIAGNOSIS — Z12.39 SCREENING BREAST EXAMINATION: ICD-10-CM

## 2019-02-22 PROCEDURE — 77067 SCR MAMMO BI INCL CAD: CPT

## 2019-08-23 ENCOUNTER — TELEPHONE (OUTPATIENT)
Dept: FAMILY MEDICINE CLINIC | Facility: CLINIC | Age: 49
End: 2019-08-23

## 2020-06-17 ENCOUNTER — TRANSCRIBE ORDERS (OUTPATIENT)
Dept: ADMINISTRATIVE | Facility: HOSPITAL | Age: 50
End: 2020-06-17

## 2020-06-17 DIAGNOSIS — Z12.31 ENCOUNTER FOR SCREENING MAMMOGRAM FOR MALIGNANT NEOPLASM OF BREAST: Primary | ICD-10-CM

## 2020-11-19 PROBLEM — M51.26 HERNIATED LUMBAR INTERVERTEBRAL DISC: Status: ACTIVE | Noted: 2017-02-23

## 2020-11-19 PROBLEM — R10.11 ABDOMINAL PAIN, RUQ: Status: ACTIVE | Noted: 2017-10-27

## 2021-04-14 ENCOUNTER — HOSPITAL ENCOUNTER (OUTPATIENT)
Dept: MAMMOGRAPHY | Facility: MEDICAL CENTER | Age: 51
Discharge: HOME | End: 2021-04-14
Payer: COMMERCIAL

## 2021-04-14 VITALS — WEIGHT: 142 LBS | BODY MASS INDEX: 27.88 KG/M2 | HEIGHT: 60 IN

## 2021-04-14 DIAGNOSIS — Z12.31 ENCOUNTER FOR SCREENING MAMMOGRAM FOR MALIGNANT NEOPLASM OF BREAST: ICD-10-CM

## 2021-04-14 PROCEDURE — 77067 SCR MAMMO BI INCL CAD: CPT

## 2021-04-14 PROCEDURE — 77063 BREAST TOMOSYNTHESIS BI: CPT

## 2022-12-30 ENCOUNTER — HOSPITAL ENCOUNTER (OUTPATIENT)
Dept: MAMMOGRAPHY | Facility: MEDICAL CENTER | Age: 52
Discharge: HOME | End: 2022-12-30

## 2022-12-30 VITALS — WEIGHT: 142 LBS | BODY MASS INDEX: 27.88 KG/M2 | HEIGHT: 60 IN

## 2022-12-30 DIAGNOSIS — Z12.31 SCREENING MAMMOGRAM FOR HIGH-RISK PATIENT: ICD-10-CM
